# Patient Record
Sex: MALE | Race: BLACK OR AFRICAN AMERICAN | NOT HISPANIC OR LATINO | URBAN - METROPOLITAN AREA
[De-identification: names, ages, dates, MRNs, and addresses within clinical notes are randomized per-mention and may not be internally consistent; named-entity substitution may affect disease eponyms.]

---
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
## 2019-01-10 NOTE — ED ADULT NURSE NOTE - FALLEN IN THE PAST
Continue Regimen: Gentamicin cream as directed Detail Level: Simple no Plan: Called in verbal rx to [facility] pharmacy for wart peel (5FU 5% with salicyclic acid 50 %) 15 mL with one refill, sig: apply to affected areas on hands and fingers QHS unde occulsion

## 2020-02-08 ENCOUNTER — TRANSCRIPTION ENCOUNTER (OUTPATIENT)
Age: 20
End: 2020-02-08

## 2020-02-22 ENCOUNTER — EMERGENCY (EMERGENCY)
Facility: HOSPITAL | Age: 20
LOS: 1 days | Discharge: HOME | End: 2020-02-22
Attending: EMERGENCY MEDICINE
Payer: SELF-PAY

## 2020-02-22 VITALS
HEIGHT: 71 IN | OXYGEN SATURATION: 98 % | WEIGHT: 205.91 LBS | DIASTOLIC BLOOD PRESSURE: 53 MMHG | HEART RATE: 145 BPM | SYSTOLIC BLOOD PRESSURE: 114 MMHG | TEMPERATURE: 99 F | RESPIRATION RATE: 18 BRPM

## 2020-02-22 VITALS
HEART RATE: 93 BPM | SYSTOLIC BLOOD PRESSURE: 138 MMHG | DIASTOLIC BLOOD PRESSURE: 90 MMHG | RESPIRATION RATE: 18 BRPM | OXYGEN SATURATION: 97 %

## 2020-02-22 PROBLEM — J45.909 UNSPECIFIED ASTHMA, UNCOMPLICATED: Chronic | Status: ACTIVE | Noted: 2017-11-16

## 2020-02-22 LAB
ALBUMIN SERPL ELPH-MCNC: 4 G/DL — SIGNIFICANT CHANGE UP (ref 3.3–5.2)
ALP SERPL-CCNC: 52 U/L — SIGNIFICANT CHANGE UP (ref 40–120)
ALT FLD-CCNC: 29 U/L — SIGNIFICANT CHANGE UP
ANION GAP SERPL CALC-SCNC: 13 MMOL/L — SIGNIFICANT CHANGE UP (ref 5–17)
APTT BLD: 30.2 SEC — SIGNIFICANT CHANGE UP (ref 27.5–36.3)
AST SERPL-CCNC: 109 U/L — HIGH
BASOPHILS # BLD AUTO: 0.03 K/UL — SIGNIFICANT CHANGE UP (ref 0–0.2)
BASOPHILS NFR BLD AUTO: 0.3 % — SIGNIFICANT CHANGE UP (ref 0–2)
BILIRUB SERPL-MCNC: <0.2 MG/DL — LOW (ref 0.4–2)
BLD GP AB SCN SERPL QL: SIGNIFICANT CHANGE UP
BUN SERPL-MCNC: 11 MG/DL — SIGNIFICANT CHANGE UP (ref 8–20)
CALCIUM SERPL-MCNC: 9.1 MG/DL — SIGNIFICANT CHANGE UP (ref 8.6–10.2)
CHLORIDE SERPL-SCNC: 101 MMOL/L — SIGNIFICANT CHANGE UP (ref 98–107)
CO2 SERPL-SCNC: 21 MMOL/L — LOW (ref 22–29)
CREAT SERPL-MCNC: 1.1 MG/DL — SIGNIFICANT CHANGE UP (ref 0.5–1.3)
EOSINOPHIL # BLD AUTO: 0.19 K/UL — SIGNIFICANT CHANGE UP (ref 0–0.5)
EOSINOPHIL NFR BLD AUTO: 1.8 % — SIGNIFICANT CHANGE UP (ref 0–6)
GLUCOSE SERPL-MCNC: 105 MG/DL — HIGH (ref 70–99)
HCT VFR BLD CALC: 45.2 % — SIGNIFICANT CHANGE UP (ref 39–50)
HGB BLD-MCNC: 14.1 G/DL — SIGNIFICANT CHANGE UP (ref 13–17)
IMM GRANULOCYTES NFR BLD AUTO: 0.4 % — SIGNIFICANT CHANGE UP (ref 0–1.5)
INR BLD: 1.02 RATIO — SIGNIFICANT CHANGE UP (ref 0.88–1.16)
LIDOCAIN IGE QN: 25 U/L — SIGNIFICANT CHANGE UP (ref 22–51)
LYMPHOCYTES # BLD AUTO: 1.08 K/UL — SIGNIFICANT CHANGE UP (ref 1–3.3)
LYMPHOCYTES # BLD AUTO: 10.2 % — LOW (ref 13–44)
MCHC RBC-ENTMCNC: 26.3 PG — LOW (ref 27–34)
MCHC RBC-ENTMCNC: 31.2 GM/DL — LOW (ref 32–36)
MCV RBC AUTO: 84.2 FL — SIGNIFICANT CHANGE UP (ref 80–100)
MONOCYTES # BLD AUTO: 0.9 K/UL — SIGNIFICANT CHANGE UP (ref 0–0.9)
MONOCYTES NFR BLD AUTO: 8.5 % — SIGNIFICANT CHANGE UP (ref 2–14)
NEUTROPHILS # BLD AUTO: 8.33 K/UL — HIGH (ref 1.8–7.4)
NEUTROPHILS NFR BLD AUTO: 78.8 % — HIGH (ref 43–77)
PLATELET # BLD AUTO: 289 K/UL — SIGNIFICANT CHANGE UP (ref 150–400)
POTASSIUM SERPL-MCNC: 4.1 MMOL/L — SIGNIFICANT CHANGE UP (ref 3.5–5.3)
POTASSIUM SERPL-SCNC: 4.1 MMOL/L — SIGNIFICANT CHANGE UP (ref 3.5–5.3)
PROT SERPL-MCNC: 7.1 G/DL — SIGNIFICANT CHANGE UP (ref 6.6–8.7)
PROTHROM AB SERPL-ACNC: 11.5 SEC — SIGNIFICANT CHANGE UP (ref 10–12.9)
RBC # BLD: 5.37 M/UL — SIGNIFICANT CHANGE UP (ref 4.2–5.8)
RBC # FLD: 13.9 % — SIGNIFICANT CHANGE UP (ref 10.3–14.5)
SODIUM SERPL-SCNC: 135 MMOL/L — SIGNIFICANT CHANGE UP (ref 135–145)
WBC # BLD: 10.57 K/UL — HIGH (ref 3.8–10.5)
WBC # FLD AUTO: 10.57 K/UL — HIGH (ref 3.8–10.5)

## 2020-02-22 PROCEDURE — 86850 RBC ANTIBODY SCREEN: CPT

## 2020-02-22 PROCEDURE — 71260 CT THORAX DX C+: CPT

## 2020-02-22 PROCEDURE — 72128 CT CHEST SPINE W/O DYE: CPT | Mod: 26

## 2020-02-22 PROCEDURE — 72125 CT NECK SPINE W/O DYE: CPT | Mod: 26

## 2020-02-22 PROCEDURE — 71260 CT THORAX DX C+: CPT | Mod: 26

## 2020-02-22 PROCEDURE — 36415 COLL VENOUS BLD VENIPUNCTURE: CPT

## 2020-02-22 PROCEDURE — 73110 X-RAY EXAM OF WRIST: CPT

## 2020-02-22 PROCEDURE — 86901 BLOOD TYPING SEROLOGIC RH(D): CPT

## 2020-02-22 PROCEDURE — 83690 ASSAY OF LIPASE: CPT

## 2020-02-22 PROCEDURE — 71045 X-RAY EXAM CHEST 1 VIEW: CPT | Mod: 26

## 2020-02-22 PROCEDURE — 74177 CT ABD & PELVIS W/CONTRAST: CPT

## 2020-02-22 PROCEDURE — 73630 X-RAY EXAM OF FOOT: CPT | Mod: 26,RT

## 2020-02-22 PROCEDURE — 86900 BLOOD TYPING SEROLOGIC ABO: CPT

## 2020-02-22 PROCEDURE — 71045 X-RAY EXAM CHEST 1 VIEW: CPT

## 2020-02-22 PROCEDURE — 73110 X-RAY EXAM OF WRIST: CPT | Mod: 26,50

## 2020-02-22 PROCEDURE — 96374 THER/PROPH/DIAG INJ IV PUSH: CPT

## 2020-02-22 PROCEDURE — 73630 X-RAY EXAM OF FOOT: CPT

## 2020-02-22 PROCEDURE — 90471 IMMUNIZATION ADMIN: CPT

## 2020-02-22 PROCEDURE — 72131 CT LUMBAR SPINE W/O DYE: CPT | Mod: 26

## 2020-02-22 PROCEDURE — 90715 TDAP VACCINE 7 YRS/> IM: CPT

## 2020-02-22 PROCEDURE — 85730 THROMBOPLASTIN TIME PARTIAL: CPT

## 2020-02-22 PROCEDURE — 99285 EMERGENCY DEPT VISIT HI MDM: CPT | Mod: 25

## 2020-02-22 PROCEDURE — 74177 CT ABD & PELVIS W/CONTRAST: CPT | Mod: 26

## 2020-02-22 PROCEDURE — 85610 PROTHROMBIN TIME: CPT

## 2020-02-22 PROCEDURE — 99291 CRITICAL CARE FIRST HOUR: CPT

## 2020-02-22 PROCEDURE — 85027 COMPLETE CBC AUTOMATED: CPT

## 2020-02-22 PROCEDURE — 72125 CT NECK SPINE W/O DYE: CPT

## 2020-02-22 PROCEDURE — 99053 MED SERV 10PM-8AM 24 HR FAC: CPT

## 2020-02-22 PROCEDURE — 70450 CT HEAD/BRAIN W/O DYE: CPT | Mod: 26

## 2020-02-22 PROCEDURE — 70450 CT HEAD/BRAIN W/O DYE: CPT

## 2020-02-22 PROCEDURE — 80053 COMPREHEN METABOLIC PANEL: CPT

## 2020-02-22 RX ORDER — TETANUS TOXOID, REDUCED DIPHTHERIA TOXOID AND ACELLULAR PERTUSSIS VACCINE, ADSORBED 5; 2.5; 8; 8; 2.5 [IU]/.5ML; [IU]/.5ML; UG/.5ML; UG/.5ML; UG/.5ML
0.5 SUSPENSION INTRAMUSCULAR ONCE
Refills: 0 | Status: COMPLETED | OUTPATIENT
Start: 2020-02-22 | End: 2020-02-22

## 2020-02-22 RX ORDER — MORPHINE SULFATE 50 MG/1
4 CAPSULE, EXTENDED RELEASE ORAL ONCE
Refills: 0 | Status: DISCONTINUED | OUTPATIENT
Start: 2020-02-22 | End: 2020-02-22

## 2020-02-22 RX ORDER — METHOCARBAMOL 500 MG/1
2 TABLET, FILM COATED ORAL
Qty: 42 | Refills: 0
Start: 2020-02-22 | End: 2020-02-28

## 2020-02-22 RX ORDER — SODIUM CHLORIDE 9 MG/ML
1000 INJECTION INTRAMUSCULAR; INTRAVENOUS; SUBCUTANEOUS ONCE
Refills: 0 | Status: COMPLETED | OUTPATIENT
Start: 2020-02-22 | End: 2020-02-22

## 2020-02-22 RX ORDER — LIDOCAINE 4 G/100G
1 CREAM TOPICAL ONCE
Refills: 0 | Status: DISCONTINUED | OUTPATIENT
Start: 2020-02-22 | End: 2020-02-22

## 2020-02-22 RX ORDER — OXYCODONE AND ACETAMINOPHEN 5; 325 MG/1; MG/1
2 TABLET ORAL ONCE
Refills: 0 | Status: DISCONTINUED | OUTPATIENT
Start: 2020-02-22 | End: 2020-02-22

## 2020-02-22 RX ORDER — IBUPROFEN 200 MG
1 TABLET ORAL
Qty: 21 | Refills: 0
Start: 2020-02-22 | End: 2020-02-28

## 2020-02-22 RX ADMIN — SODIUM CHLORIDE 1000 MILLILITER(S): 9 INJECTION INTRAMUSCULAR; INTRAVENOUS; SUBCUTANEOUS at 04:05

## 2020-02-22 RX ADMIN — MORPHINE SULFATE 4 MILLIGRAM(S): 50 CAPSULE, EXTENDED RELEASE ORAL at 05:19

## 2020-02-22 RX ADMIN — TETANUS TOXOID, REDUCED DIPHTHERIA TOXOID AND ACELLULAR PERTUSSIS VACCINE, ADSORBED 0.5 MILLILITER(S): 5; 2.5; 8; 8; 2.5 SUSPENSION INTRAMUSCULAR at 05:21

## 2020-02-22 RX ADMIN — MORPHINE SULFATE 4 MILLIGRAM(S): 50 CAPSULE, EXTENDED RELEASE ORAL at 04:00

## 2020-02-22 RX ADMIN — OXYCODONE AND ACETAMINOPHEN 2 TABLET(S): 5; 325 TABLET ORAL at 05:24
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
## 2020-02-22 NOTE — ED ADULT NURSE NOTE - CHPI ED NUR SYMPTOMS NEG
no bruising/no fussiness/no loss of consciousness/no acting out behaviors/no crying/no decreased eating/drinking/no difficulty bearing weight/no disorientation/no dizziness/no sleeping issues
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
## 2020-02-22 NOTE — ED PROVIDER NOTE - PHYSICAL EXAMINATION
General: mild distress 2/2 pain, pt lying in bed comfortably  Neuro: A+Ox3  HEENT: NC/AT, EOMI  Neck: Soft, supple, mildine c-spine tenderness, no stepoffs  Cardio: tachycardic, regular rhythm, nml S1/S2  Resp: Good effort, CTA b/l  Thorax: No chest wall tenderness  GI/Abd: Soft, NT/ND, no rebound/guarding, no masses palpated  Vascular: All 4 extremities warm  Skin: Intact, no breakdown  Pelvis: stable  Musculoskeletal: All 4 extremities moving spontaneously, no limitations, midline thoracic and lumbar spine tenderness, no stepoffs General: mild distress 2/2 pain, pt lying in bed comfortably  Neuro: A+Ox3  HEENT: NC/AT, EOMI  Neck: Soft, supple, mildine c-spine tenderness, no stepoffs  Cardio: tachycardic, regular rhythm, nml S1/S2  Resp: Good effort, CTA b/l  Thorax: No chest wall tenderness  GI/Abd: Soft, NT/ND, no rebound/guarding, no masses palpated  Vascular: All 4 extremities warm  Skin: abrasion over dorsal L wrist, R knuckles, R foot  Pelvis: stable  Musculoskeletal: All 4 extremities moving spontaneously, no limitations, midline thoracic and lumbar spine tenderness, no stepoffs
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
## 2020-02-22 NOTE — ED PROVIDER NOTE - PATIENT PORTAL LINK FT
You can access the FollowMyHealth Patient Portal offered by [facility] by registering at the following website: [email or web address]. By joining [employer]’s FollowMyHealth portal, you will also be able to view your health information using other applications (apps) compatible with our system.

## 2020-02-22 NOTE — ED ADULT NURSE NOTE - NSIMPLEMENTINTERV_GEN_ALL_ED
Implemented All Fall Risk Interventions:  [place] to call system. Call bell, personal items and telephone within reach. Instruct patient to call for assistance. Room bathroom lighting operational. Non-slip footwear when patient is off stretcher. Physically safe environment: no spills, clutter or unnecessary equipment. Stretcher in lowest position, wheels locked, appropriate side rails in place. Provide visual cue, wrist band, yellow gown, etc. Monitor gait and stability. Monitor for mental status changes and reorient to person, place, and time. Review medications for side effects contributing to fall risk. Reinforce activity limits and safety measures with patient and family.
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
## 2020-07-13 NOTE — ED ADULT NURSE NOTE - NS ED NURSE LEVEL OF CONSCIOUSNESS ORIENTATION
Oriented - self; Oriented - place; Oriented - time
Price (Do Not Change): 0.00
Instructions: This plan will send the code FBSD to the PM system.  DO NOT or CHANGE the price.
Detail Level: Simple

## 2020-11-02 ENCOUNTER — EMERGENCY (EMERGENCY)
Facility: HOSPITAL | Age: 20
LOS: 1 days | Discharge: HOME | End: 2020-11-02
Attending: EMERGENCY MEDICINE
Payer: SELF-PAY

## 2020-11-02 VITALS
OXYGEN SATURATION: 97 % | HEART RATE: 60 BPM | DIASTOLIC BLOOD PRESSURE: 84 MMHG | TEMPERATURE: 99 F | HEIGHT: 71 IN | RESPIRATION RATE: 18 BRPM | WEIGHT: 190.04 LBS | SYSTOLIC BLOOD PRESSURE: 142 MMHG

## 2020-11-02 VITALS
HEART RATE: 63 BPM | RESPIRATION RATE: 18 BRPM | DIASTOLIC BLOOD PRESSURE: 80 MMHG | OXYGEN SATURATION: 96 % | SYSTOLIC BLOOD PRESSURE: 142 MMHG

## 2020-11-02 PROCEDURE — 99053 MED SERV 10PM-8AM 24 HR FAC: CPT

## 2020-11-02 PROCEDURE — 96375 TX/PRO/DX INJ NEW DRUG ADDON: CPT

## 2020-11-02 PROCEDURE — 96374 THER/PROPH/DIAG INJ IV PUSH: CPT

## 2020-11-02 PROCEDURE — 99284 EMERGENCY DEPT VISIT MOD MDM: CPT | Mod: 25

## 2020-11-02 PROCEDURE — 99284 EMERGENCY DEPT VISIT MOD MDM: CPT

## 2020-11-02 RX ORDER — DIPHENHYDRAMINE HCL 50 MG
50 CAPSULE ORAL ONCE
Refills: 0 | Status: COMPLETED | OUTPATIENT
Start: 2020-11-02 | End: 2020-11-02

## 2020-11-02 RX ADMIN — Medication 50 MILLIGRAM(S): at 03:43

## 2020-11-02 RX ADMIN — Medication 125 MILLIGRAM(S): at 03:43

## 2020-11-02 NOTE — ED PROVIDER NOTE - PATIENT PORTAL LINK FT
You can access the FollowMyHealth Patient Portal offered by [facility] by registering at the following website: [email or web address]. By joining [employer]’s FollowMyHealth portal, you will also be able to view your health information using other applications (apps) compatible with our system.
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
## 2020-11-02 NOTE — ED ADULT NURSE NOTE - CHIEF COMPLAINT QUOTE
c/o swelling to right side of face and upper lip, and throat tightness. pt states he was playing with a dog earlier today, denies any food ingestion. pt denies any hives, sob, cp, fever or chills. pt brought to critical care, md corona to reji.

## 2020-11-02 NOTE — ED ADULT NURSE NOTE - NSIMPLEMENTINTERV_GEN_ALL_ED
Implemented All Universal Safety Interventions:  [place] to call system. Call bell, personal items and telephone within reach. Instruct patient to call for assistance. Room bathroom lighting operational. Non-slip footwear when patient is off stretcher. Physically safe environment: no spills, clutter or unnecessary equipment. Stretcher in lowest position, wheels locked, appropriate side rails in place.

## 2020-11-02 NOTE — ED PROVIDER NOTE - OBJECTIVE STATEMENT
pt presents to ED stating developed sob and right eye itching post beintg exposed to a dog. no drooling no stridor. no chest pain. no headache no abd pain no vomiting. no overt rash . no diplopia or loss fo vision

## 2020-11-02 NOTE — ED ADULT NURSE REASSESSMENT NOTE - NS ED NURSE REASSESS COMMENT FT1
Pt ambulated to bathroom independently.  Right eye swelling has decreased and pt states his breathing has improved.  NAD noted, respirations even and unlabored.  POC explained pt verbalized understanding.

## 2020-11-02 NOTE — ED ADULT NURSE NOTE - OBJECTIVE STATEMENT
pt brought to critical care due to allergic reaction.  Pt states he pet a dog and his R eye became itchy and he felt a tightness in his chest that started at 0200.  Swelling noted to right eye. pt denies itching and difficulty swallowing, no swelling noted to tongue. pt reports that "I usually am fine around dogs" , denies other possible allergens.   Lung sounds clear b/l upon auscultation.  NAD noted, respirations even and unlabored.  MD Baez at bedside for eval.  Safety maintained.  Plan of care explained, pt verbalized understanding.

## 2021-01-13 ENCOUNTER — EMERGENCY (EMERGENCY)
Facility: HOSPITAL | Age: 21
LOS: 1 days | Discharge: HOME | End: 2021-01-13
Attending: EMERGENCY MEDICINE
Payer: COMMERCIAL

## 2021-01-13 VITALS
TEMPERATURE: 98 F | HEART RATE: 68 BPM | DIASTOLIC BLOOD PRESSURE: 70 MMHG | WEIGHT: 218.04 LBS | SYSTOLIC BLOOD PRESSURE: 153 MMHG | OXYGEN SATURATION: 98 % | HEIGHT: 71 IN | RESPIRATION RATE: 18 BRPM

## 2021-01-13 PROCEDURE — 99284 EMERGENCY DEPT VISIT MOD MDM: CPT

## 2021-01-13 PROCEDURE — 96374 THER/PROPH/DIAG INJ IV PUSH: CPT

## 2021-01-13 PROCEDURE — 96375 TX/PRO/DX INJ NEW DRUG ADDON: CPT

## 2021-01-13 PROCEDURE — 99284 EMERGENCY DEPT VISIT MOD MDM: CPT | Mod: 25

## 2021-01-13 RX ORDER — DIPHENHYDRAMINE HCL 50 MG
50 CAPSULE ORAL ONCE
Refills: 0 | Status: COMPLETED | OUTPATIENT
Start: 2021-01-13 | End: 2021-01-13

## 2021-01-13 RX ORDER — DIPHENHYDRAMINE HCL 50 MG
2 CAPSULE ORAL
Qty: 16 | Refills: 0
Start: 2021-01-13 | End: 2021-01-14

## 2021-01-13 RX ADMIN — Medication 125 MILLIGRAM(S): at 02:25

## 2021-01-13 RX ADMIN — Medication 50 MILLIGRAM(S): at 02:24

## 2021-01-13 NOTE — ED PROVIDER NOTE - PATIENT PORTAL LINK FT
You can access the FollowMyHealth Patient Portal offered by [facility] by registering at the following website: [email or web address]. By joining [employer]’s FollowMyHealth portal, you will also be able to view your health information using other applications (apps) compatible with our system.

## 2021-01-13 NOTE — ED PROVIDER NOTE - CLINICAL SUMMARY MEDICAL DECISION MAKING FREE TEXT BOX
no sings anaphylaxis benadryl prn suggested return precautions given. f/u allergist recommended without fail

## 2021-01-13 NOTE — ED ADULT NURSE NOTE - OBJECTIVE STATEMENT
pt arrived c/o allergic reaction. states he ate nuts around midnight and approx 30 min ago noticed he became itchy, lips swollen and felt slight chest tightness. speech clear. no rash noted. nsr on cm. minor lip swelling noted. no tongue swelling airway patent. pt aao x4. respirations even and unlabored. no wheeze noted. skin color wnl warm and dry., Dr Quezada at bedside.

## 2021-01-13 NOTE — ED ADULT TRIAGE NOTE - CHIEF COMPLAINT QUOTE
c/o allergic reaction to peanuts. pt states he ate peanuts 1/2 hours ago, pt c/o itchiness, redness, swelling and tingling to lips, throat tightness, and sob. pt denies any other s/s. pt states this is a new allergy.

## 2021-01-13 NOTE — ED PROVIDER NOTE - OBJECTIVE STATEMENT
pt presents to ED s/p eating peanuts and feeling "ithcy all over" no drooling no stridor no wheezing no sob. he is describing urticaria that have since resolvceed. no current chest pain or sob no n/v/d. no other acute issues symptoms or concerns.

## 2021-01-19 PROBLEM — Z00.00 ENCOUNTER FOR PREVENTIVE HEALTH EXAMINATION: Status: ACTIVE | Noted: 2021-01-19

## 2021-03-29 ENCOUNTER — APPOINTMENT (OUTPATIENT)
Dept: NEUROLOGY | Facility: CLINIC | Age: 21
End: 2021-03-29

## 2021-08-12 NOTE — ED PROVIDER NOTE - CLINICAL SUMMARY MEDICAL DECISION MAKING FREE TEXT BOX
possible high adh component as well from pulmonary lesions  pulmonary follow up    Hypercalcemia- f/u PTHrp, iPTH, 25-OH vitamin D, 1,25 vitamin D, SPEP, serum MESHA, serum free light chains no signs anaphyloaxis feeling better po benadrul q 6 hours advised return precautions given to pt

## 2022-02-22 ENCOUNTER — APPOINTMENT (OUTPATIENT)
Dept: ORTHOPEDIC SURGERY | Facility: CLINIC | Age: 22
End: 2022-02-22
Payer: COMMERCIAL

## 2022-02-22 VITALS
BODY MASS INDEX: 26.18 KG/M2 | SYSTOLIC BLOOD PRESSURE: 115 MMHG | HEART RATE: 43 BPM | HEIGHT: 71 IN | WEIGHT: 187 LBS | TEMPERATURE: 98.1 F | DIASTOLIC BLOOD PRESSURE: 70 MMHG

## 2022-02-22 DIAGNOSIS — Z78.9 OTHER SPECIFIED HEALTH STATUS: ICD-10-CM

## 2022-02-22 DIAGNOSIS — M24.811 OTHER SPECIFIC JOINT DERANGEMENTS OF RIGHT SHOULDER, NOT ELSEWHERE CLASSIFIED: ICD-10-CM

## 2022-02-22 DIAGNOSIS — M25.511 PAIN IN RIGHT SHOULDER: ICD-10-CM

## 2022-02-22 DIAGNOSIS — Z87.09 PERSONAL HISTORY OF OTHER DISEASES OF THE RESPIRATORY SYSTEM: ICD-10-CM

## 2022-02-22 PROCEDURE — 99203 OFFICE O/P NEW LOW 30 MIN: CPT

## 2022-02-22 PROCEDURE — 73030 X-RAY EXAM OF SHOULDER: CPT | Mod: RT

## 2022-02-22 RX ORDER — MELOXICAM 15 MG/1
15 TABLET ORAL
Qty: 30 | Refills: 2 | Status: ACTIVE | COMMUNITY
Start: 2022-02-22 | End: 1900-01-01

## 2022-02-22 NOTE — ASSESSMENT
[FreeTextEntry1] : Patient with right shoulder pain consistent with internal derangement. The nature of this condition was described at length with the patient he verbalized understanding. I recommend a period of 2 weeks of rest as well as a course of 2 weeks of meloxicam for an anti-inflammatory. The patient may then ease his way back into [facility]. He will follow back up in the office as needed should his pain not improve. The patient is in agreement with this plan and appreciative of his care.

## 2022-02-22 NOTE — HISTORY OF PRESENT ILLNESS
[FreeTextEntry1] : 02/22/2022: Patient is a 21 year-old, right-hand-dominant male who presents to the office today for initial evaluation of right shoulder pain. The pain has been present since Thursday of last week. He was at [employer] practice and woke up the next day with tight/achy posterior shoulder pain. He has been having pain with continuation of [employer] activities. He has pain at end range of motion in the overhead position. He states the pain is only with overhead movements (forward flexion, but not abduction) and not at rest. The pain does not radiate. He has not tried any medications to alleviate the pain.

## 2022-02-22 NOTE — PHYSICAL EXAM
[Normal Finger/nose] : finger to nose coordination [Normal] : no peripheral adenopathy appreciated [de-identified] : Right Shoulder Exam\par \par Inspection: No malalignment, no defects\par Skin: No masses, no lesions, skin intact\par Neck: Negative Spurlings, full ROM without pain\par ROM: Full active ROM of the RIGHT shoulder with pain at end ROM on FF, Full active ROM of the LEFT shoulder without pain\par Tenderness: No bicipital tenderness, no tenderness to the greater tuberosity/RTC insertion, no anterior shoulder/lesser tuberosity tenderness\par Strength: 5/5 strength against resistance with ER, IR, and Supraspinatus testing.\par AC Joint: No TTP, no pain with cross arm testing.\par Biceps: Speed negative, Yergusons negative\par Impingement Test: Negative Kerr\par Empty Can Test: Mildly positive\par Stability: Negative Apprehension, negative anterior/posterior load and shift\par Vascular: 2+ radial pulse\par Neuro: AIN/PIN/Ulnar nn. intact to motor\par Sensation: Grossly intact without deficits\par  [de-identified] : BRIAR [de-identified] : 3 xray views of the right shoulder were obtained. No fractures or dislocations.

## 2022-02-23 PROBLEM — M25.511 RIGHT SHOULDER PAIN: Status: ACTIVE | Noted: 2022-02-22

## 2022-05-12 NOTE — ED PROCEDURE NOTE - NS ED ATTENDING STATEMENT MOD
Called to give results on stress test,  LMOM  The results have been release to portal,  Please call me if any questions or if they would like to schedule the loop recorder.     Attending Only

## 2022-09-26 ENCOUNTER — NON-APPOINTMENT (OUTPATIENT)
Age: 22
End: 2022-09-26

## 2022-10-04 ENCOUNTER — APPOINTMENT (OUTPATIENT)
Dept: CARDIOLOGY | Facility: CLINIC | Age: 22
End: 2022-10-04

## 2023-04-04 NOTE — ED PROVIDER NOTE - DISPOSITION TYPE
Number Of Freeze-Thaw Cycles: 1 freeze-thaw cycle Render Post-Care Instructions In Note?: no Show Applicator Variable?: Yes Consent: The patient's consent was obtained including but not limited to risks of crusting, scabbing, blistering, scarring, darker or lighter pigmentary change, recurrence, incomplete removal and infection. Detail Level: Detailed Duration Of Freeze Thaw-Cycle (Seconds): 7 Post-Care Instructions: I reviewed with the patient in detail post-care instructions. Patient is to wear sunprotection, and avoid picking at any of the treated lesions. Pt may apply Vaseline to crusted or scabbing areas. DISCHARGE

## 2023-06-13 ENCOUNTER — APPOINTMENT (OUTPATIENT)
Dept: ORTHOPEDIC SURGERY | Facility: CLINIC | Age: 23
End: 2023-06-13
Payer: COMMERCIAL

## 2023-06-13 VITALS — HEIGHT: 71 IN | WEIGHT: 190 LBS | BODY MASS INDEX: 26.6 KG/M2

## 2023-06-13 DIAGNOSIS — M25.561 PAIN IN RIGHT KNEE: ICD-10-CM

## 2023-06-13 DIAGNOSIS — M25.562 PAIN IN RIGHT KNEE: ICD-10-CM

## 2023-06-13 PROCEDURE — 73564 X-RAY EXAM KNEE 4 OR MORE: CPT | Mod: 50

## 2023-06-13 PROCEDURE — L1812: CPT | Mod: RT

## 2023-06-13 PROCEDURE — 99204 OFFICE O/P NEW MOD 45 MIN: CPT

## 2023-06-13 NOTE — HISTORY OF PRESENT ILLNESS
[de-identified] : The patient is a 23 year old [RIGHT/LEFT] hand dominant male who presents today complaining of BL KNEES.  R>L\par Date of Injury/Onset: 7 years ago\par Pain:    At Rest: 0/10 \par With Activity:  4-5/10 \par Mechanism of injury: no specific injury\par This is NOT a Work Related Injury being treated under Worker's Compensation.\par This is NOT an athletic injury occurring associated with an interscholastic or organized sports team.\par Quality of symptoms: locking, popping, throbbing, aching \par Improves with: rest, elevation \par Worse with: extensive workout, bending \par Prior treatment: n/a\par Prior Imaging: n/a\par Out of work/sport: _, since _\par School/Sport/Position/Occupation: construction \par Additional Information: None\par \par

## 2023-06-13 NOTE — DATA REVIEWED
[FreeTextEntry1] : 6/13/23\par OC X RAY Bilateral Knee: 4 view:\par patellar zeyad, otherwise unremarkable

## 2023-06-13 NOTE — DISCUSSION/SUMMARY
[de-identified] : Reviewed all images with patient. \par Physical therapy prescribed for Patellofemoral Syndrome (PFS).\par True pull brace provided.\par Patient will follow up in 6 weeks if pain persists. \par \par \par \par \par -----------------------------------------------\par Home Exercise\par The patient is instructed on a home exercise program.\par \par JENNIE PABLO Acting as a Scribe for Dr. Collazo\par I, Jennie Pablo, attest that this documentation has been prepared under the direction and in the presence of Provider Rafael Collazo MD.\par \par Activity Modification\par The patient was advised to modify their activities.\par \par Dx / Natural History\par The patient was advised of the diagnosis.  The natural history of the pathology was explained in full to the patient in layman's terms.  Several different treatment options were discussed and explained in full to the patient including the risks and benefits of both surgical and non-surgical treatments.  All questions and concerns were answered.\par \par Pain Guide Activities\par The patient was advised to let pain guide the gradual advancement of activities.\par \par RICE\par I explained to the patient that rest, ice, compression, and elevation would benefit them.  They may return to activity after follow-up or when they no longer have any pain.\par \par The patient's current medication management of their orthopedic diagnosis was reviewed today:\par (1) We discussed a comprehensive treatment plan that included possible pharmaceutical management involving the use of prescription strength medications including but not limited to options such as oral Naprosyn 500mg BID, once daily Meloxicam 15 mg, or 500-650 mg Tylenol versus over the counter oral medications and topical prescription NSAID Pennsaid vs over the counter Voltaren gel.\par (2) There is a moderate risk of morbidity with further treatment, especially from use of prescription strength medications and possible side effects of these medications which include upset stomach with oral medications, skin reactions to topical medications and cardiac/renal issues with long term use.\par (3) I recommended that the patient follow-up with their medical physician to discuss any significant specific potential issues with long term medication use such as interactions with current medications or with exacerbation of underlying medical comorbidities.\par (4) The benefits and risks associated with use of injectable, oral or topical, prescription and over the counter anti-inflammatory medications were discussed with the patient. The patient voiced understanding of the risks including but not limited to bleeding, stroke, kidney dysfunction, heart disease, and were referred to the black box warning label for further information.

## 2023-06-13 NOTE — PHYSICAL EXAM
[de-identified] : Neurovascularly intact distally\par \par \par \par Right Knee: Inspection of the knee is as follows: no effusion, erythema, ecchymosis, scars or deformities. Palpation of the knee is as follows: medial facet of patella tenderness, patellar compression tenderness and patellofemoral tenderness. Knee Range of Motion is as follows: full flexion and extension without pain (0-140). Strength examination of the knee is as follows: Quadriceps strength is 5/5 Hamstring strength is 5/5 Ligament Stability and Special Test ligamentously stable, negative anterior draw, negative Lachman test, negative posterior draw and no varus or valgus instability. Neurological examination of the knee is as follows: light touch is intact throughout. Gait and function is as follows: non-antalgic gait. \par \par Left Knee: Inspection of the knee is as follows: no effusion, erythema, ecchymosis, scars or deformities. Palpation of the knee is as follows: medial facet of patella tenderness and patellofemoral tenderness. Knee Range of Motion is as follows: full flexion and extension without pain (0-140). Strength examination of the knee is as follows: Quadriceps strength is 5/5 Hamstring strength is 5/5 Ligament Stability and Special Test lateral maltracking of patella and positive J sign. ligamentously stable, negative anterior draw, negative Lachman test, negative posterior draw and no varus or valgus instability. Neurological examination of the knee is as follows: light touch is intact throughout. Gait and function is as follows: non-antalgic gait. \par

## 2023-06-14 DIAGNOSIS — M22.2X2 PATELLOFEMORAL DISORDERS, RIGHT KNEE: ICD-10-CM

## 2023-06-14 DIAGNOSIS — M22.2X1 PATELLOFEMORAL DISORDERS, RIGHT KNEE: ICD-10-CM

## 2023-08-01 NOTE — ED ADULT NURSE NOTE - NS ED NURSE LEVEL OF CONSCIOUSNESS MENTAL STATUS
Awake/Cooperative/Alert Ivermectin Counseling:  Patient instructed to take medication on an empty stomach with a full glass of water.  Patient informed of potential adverse effects including but not limited to nausea, diarrhea, dizziness, itching, and swelling of the extremities or lymph nodes.  The patient verbalized understanding of the proper use and possible adverse effects of ivermectin.  All of the patient's questions and concerns were addressed.

## 2023-10-05 ENCOUNTER — NON-APPOINTMENT (OUTPATIENT)
Age: 23
End: 2023-10-05

## 2023-10-12 ENCOUNTER — NON-APPOINTMENT (OUTPATIENT)
Age: 23
End: 2023-10-12

## 2023-10-17 ENCOUNTER — NON-APPOINTMENT (OUTPATIENT)
Age: 23
End: 2023-10-17

## 2023-12-15 NOTE — ED ADULT NURSE NOTE - CAS TRG GEN SKIN CONDITION
12/15/2023   [facility]  N/A  For questions about this resource list or additional care needs, please contact your primary care clinic or care manager.  Phone: [phone]   Email: N/A   Address: [address] [place] [address]   Hours: N/A        Financial Stability       Rent and mortgage payment assistance  1  Neighborhood Assistance [employer] - Home Save Program Distance: 2.19 miles      Phone/Virtual   [address] [id number]  Language: English, [place] Islander  Hours: Mon - Fri 9:00 AM - 5:00 PM  Fees: Free   Phone: (123) 118-3701 Email: services@[employer] Website: https://www.[employer]     2  [facility] - Rent Assistance Distance: 2.54 miles      In-Person   [address] [place] [address]  Language: English, [place] Islander  Hours: Mon - Sat 8:00 AM - 12:00 PM , Mon - Tue 1:00 PM - 8:00 PM , Wed 1:00 PM - 4:00 PM , Thu - Fri 1:00 PM - 8:00 PM , Sat 12:30 PM - 4:00 PM  Fees: Free   Phone: (496) 914-5899 Email: [email or web address].org Website: [email or web address]://[facility].org/[facility]/[place]/#[email or web address]          Food and Nutrition       Food pantry  3  [facility] Distance: 0.6 miles      [address] [place] [address]  Language: English, [place]er  Hours: Wed 6:30 PM - 7:00 PM  Fees: Free   Phone: (520) 991-6140 Email: [email or web address]@[email or web address] Website: http://[facility].org/     4  [employer] and [employer] Distance: 1.2 miles      [facility]   [address] [place] [address]  Language: English, [place] Islander  Hours: Mon - Tue 8:00 AM - 4:30 PM , Wed 10:00 AM - 6:30 PM , Thu 8:00 AM - 4:30 PM  Fees: Free   Phone: (260) 419-3326 Email: maylin@[employer] Website: http://www.[employer].org/     SNAP application assistance  5  [facility] -  [employer] ([employer]) Distance: 2.45 miles      In-Person, Phone/Virtual    [address] [place] [address]  Language: Slovak, Romansh, English, Gujarati, Uriel, Tamazight, Yi, Pashto, Hungarian, Macedonian  Hours: Mon - Wed 9:00 AM - 5:00 PM , Thu 12:00 PM - 6:00 PM , Fri 9:00 AM - 5:00 PM , Sun 10:30 AM - 2:00 PM Appt. Only  Fees: Free   Phone: (485) 772-6517 Email: info@[employer] Website: https://www.[employer] / [employer]/     6  [facility] [employer] [place] ([facility]) Distance: 2.91 miles      In-Person, Phone/Virtual   [address] [address]  Language: English, Hmong  Hours: Mon - Fri 8:30 AM - 4:30 PM  Fees: Free   Phone: (114) 622-9947 Ext.1 Email: info@[employer] Website: https://www.[employer]/     Soup kitchen or free meals  7  [facility] - Community Health Dinner Distance: 1.12 miles      [facility]   [address] [place] [address]  Language: English  Hours: Tue 5:00 PM - 6:30 PM  Fees: Free   Phone: (792) 936-5991 Email: admin@[facility] [employer]800[facility].[employer] Website: http://www.[facility] [employer]800[facility].[employer]/     8  [facility] - Community Health Meal Distance: 1.34 miles      Pick   [address] [place] [id number]  Language: English  Hours: Mon 5:00 PM - 5:45 PM  Fees: Free   Phone: (331) 659-5841 Email: office@[employer].org Website: http://www.[employer].org          Important Numbers & Websites       Emergency Services   911  City Services   311  Poison Control   (346) 618-7983  Suicide Prevention Lifeline   (461) 942-6423 (TALK)  Child Abuse Hotline   (966) 840-9527 (4-A-Child)  Sexual Assault Hotline   (309) 231-6555 (HOPE)  National Runaway Safeline   (799) 527-6726 (RUNAWAY)  All-Options Talkline   (998) 447-7920  Substance Abuse Referral   (178) 449-1774 (HELP)     Warm/Dry

## 2024-02-09 NOTE — ED ADULT TRIAGE NOTE - CHIEF COMPLAINT QUOTE
c/o swelling to right side of face and upper lip, and throat tightness. pt states he was playing with a dog earlier today, denies any food ingestion. pt denies any hives, sob, cp, fever or chills. pt brought to critical care, md corona to reji. Negative